# Patient Record
Sex: FEMALE | Race: WHITE | URBAN - METROPOLITAN AREA
[De-identification: names, ages, dates, MRNs, and addresses within clinical notes are randomized per-mention and may not be internally consistent; named-entity substitution may affect disease eponyms.]

---

## 2017-04-05 ENCOUNTER — OFFICE VISIT (OUTPATIENT)
Dept: PULMONOLOGY | Facility: CLINIC | Age: 32
End: 2017-04-05
Attending: INTERNAL MEDICINE
Payer: COMMERCIAL

## 2017-04-05 VITALS
DIASTOLIC BLOOD PRESSURE: 87 MMHG | TEMPERATURE: 97.7 F | WEIGHT: 227.3 LBS | HEIGHT: 67 IN | SYSTOLIC BLOOD PRESSURE: 124 MMHG | RESPIRATION RATE: 18 BRPM | BODY MASS INDEX: 35.67 KG/M2 | HEART RATE: 62 BPM | OXYGEN SATURATION: 98 %

## 2017-04-05 DIAGNOSIS — R91.8 PULMONARY NODULES: Primary | ICD-10-CM

## 2017-04-05 PROCEDURE — 99212 OFFICE O/P EST SF 10 MIN: CPT | Mod: ZF

## 2017-04-05 RX ORDER — CLONAZEPAM 0.5 MG/1
0.5 TABLET ORAL PRN
COMMUNITY
Start: 2016-03-28

## 2017-04-05 RX ORDER — MULTIPLE VITAMINS W/ MINERALS TAB 9MG-400MCG
1 TAB ORAL DAILY
COMMUNITY

## 2017-04-05 ASSESSMENT — PAIN SCALES - GENERAL: PAINLEVEL: NO PAIN (0)

## 2017-04-05 NOTE — MR AVS SNAPSHOT
"              After Visit Summary   2017    Job Christina    MRN: [id number]           Patient Information     Date Of Birth          1985        Visit Information        Provider Department      2017 3:20 PM Toney Castano MD [facility]        Today's Diagnoses     Pulmonary nodules    -  1       Follow-ups after your visit        Who to contact     If you have questions or need follow up information about today's clinic visit or your schedule please contact [facility] directly at [phone].  Normal or non-critical lab and imaging results will be communicated to you by [employer], letter or phone within 4 business days after the clinic has received the results. If you do not hear from us within 7 days, please contact the clinic through [employer] or phone. If you have a critical or abnormal lab result, we will notify you by phone as soon as possible.  Submit refill requests through [employer] or call your pharmacy and they will forward the refill request to us. Please allow 3 business days for your refill to be completed.          Additional Information About Your Visit        MyChart Information     [employer] lets you send messages to your doctor, view your test results, renew your prescriptions, schedule appointments and more. To sign up, go to [email or web address] . Click on \"Log in\" on the left side of the screen, which will take you to the Welcome page. Then click on \"Sign up Now\" on the right side of the page.     You will be asked to enter the access code listed below, as well as some personal information. Please follow the directions to create your username and password.     Your access code is: [email or web address]  Expires: 7/3/2017  6:30 AM     Your access code will  in 90 days. If you need help or a new code, please call your [facility] or [phone].        Care EveryWhere ID     This is your Care EveryWhere ID. This could be used by other " "organizations to access your [place] medical records  [id number]        Your Vitals Were     Pulse Temperature Respirations Height Last Period Pulse Oximetry    62 97.7  F (36.5  C) (Oral) 18 1.702 m (5' 7.01\") 03/21/2017 98%    BMI (Body Mass Index)                   35.59 kg/m2            Blood Pressure from Last 3 Encounters:   04/05/17 124/87   01/27/16 123/80   02/11/15 129/88    Weight from Last 3 Encounters:   04/05/17 103.1 kg (227 lb 4.8 oz)   01/27/16 97.9 kg (215 lb 14.4 oz)   02/11/15 94.2 kg (207 lb 9.6 oz)              Today, you had the following     No orders found for display         Today's Medication Changes          These changes are accurate as of: 4/5/17 11:59 PM.  If you have any questions, ask your nurse or doctor.               Stop taking these medicines if you haven't already. Please contact your care team if you have questions.     CHATEAL PO   Stopped by:  Toney Castano MD                    Primary Care Provider    Physician No Ref-Primary       No address on file        Thank you!     Thank you for choosing [facility]  for your care. Our goal is always to provide you with excellent care. Hearing back from our patients is one way we can continue to improve our services. Please take a few minutes to complete the written survey that you may receive in the mail after your visit with us. Thank you!             Your Updated Medication List - Protect others around you: Learn how to safely use, store and throw away your medicines at www.disposemymeds.org.          This list is accurate as of: 4/5/17 11:59 PM.  Always use your most recent med list.                   Brand Name Dispense Instructions for use    Biotin 5000 MCG Caps      Take 5,000 mcg by mouth daily       clonazePAM 0.5 MG tablet    klonoPIN     Take 0.5 mg by mouth as needed       COLLAGEN PO      Take by mouth daily       IBUPROFEN PO      Take 200 mg by mouth every 6 hours as needed for moderate " pain       Multi-vitamin Tabs tablet      Take 1 tablet by mouth daily       OMEGA-3 FISH OIL PO      Take by mouth daily

## 2017-04-05 NOTE — NURSING NOTE
"Job Christina is a 31 year old female who presents for:  Chief Complaint   Patient presents with     Oncology Clinic Visit     F/U Lung Nodule with CT Prior        Initial Vitals:  /87 (BP Location: Right arm, Patient Position: Chair, Cuff Size: Adult Large)  Pulse 62  Temp 97.7  F (36.5  C) (Oral)  Resp 18  Ht 1.702 m (5' 7.01\")  Wt 103.1 kg (227 lb 4.8 oz)  LMP 03/21/2017  SpO2 98%  BMI 35.59 kg/m2 Estimated body mass index is 35.59 kg/(m^2) as calculated from the following:    Height as of this encounter: 1.702 m (5' 7.01\").    Weight as of this encounter: 103.1 kg (227 lb 4.8 oz).. Body surface area is 2.21 meters squared. BP completed using cuff size: large  No Pain (0) Patient's last menstrual period was 03/21/2017. Allergies and medications reviewed.     Medications: Medication refills not needed today.  Pharmacy name entered into studdex: [id number] [address] AT [facility] [address]    Comments:     7 minutes for nursing intake (face to face time)   Meseret Crawley LPN        "

## 2017-04-05 NOTE — PROGRESS NOTES
[facility]  Lung Nodule Clinic Note  April 5, 2017    Chief complaint:  Job Christina is a 31 year old female seen in the Pulmonary Clinic  for   Chief Complaint   Patient presents with     Oncology Clinic Visit     F/U Lung Nodule with CT Prior     Assessment and Plan:  Pulmonary nodule.  This has been stable for the last 2 years.  No further followup is needed.  She smoked 10 pack years therefore not qualified for lung cancer screening ct chest..      I spent more than 15 minutes face to face and greater than 50% of time was for counseling and coordination of care about pulm nodule    History of Present Illness:  The patient is a 31-year-old woman with a right middle lobe pulmonary nodule.  She is here today for a followup after having a CT chest. No respiratory symptoms.     Exposure history: Asbestos;  No , TB;  No , Radiation;   No     Allergies   Allergen Reactions     Augmentin      Other reaction(s): Gastrointestinal  vomiting     Codeine      Other reaction(s): Vomiting     Codeine Camsylate Nausea and Vomiting     Sulfa Drugs Unknown     Other reaction(s): Vomiting        No past medical history on file.     Past Surgical History:   Procedure Laterality Date     NO HISTORY OF SURGERY  11/14/13    derm        Social History     Social History     Marital status: Single     Spouse name: N/A     Number of children: N/A     Years of education: N/A     Occupational History     Not on file.     Social History Main Topics     Smoking status: Former Smoker     Types: Cigarettes     Smokeless tobacco: Never Used     Alcohol use Not on file     Drug use: Not on file     Sexual activity: Not on file     Other Topics Concern     Not on file     Social History Narrative        Family History   Problem Relation Age of Onset     DIABETES Paternal Grandmother      Glaucoma Paternal Grandmother      Macular Degeneration Paternal Grandmother         Immunization History   Administered Date(s) Administered     Influenza  (IIV3) 10/22/2012       Current Outpatient Prescriptions   Medication Sig     clonazePAM (KLONOPIN) 0.5 MG tablet Take 0.5 mg by mouth as needed     multivitamin, therapeutic with minerals (MULTI-VITAMIN) TABS tablet Take 1 tablet by mouth daily     Biotin 5000 MCG CAPS Take 5,000 mcg by mouth daily     COLLAGEN PO Take by mouth daily     Omega-3 Fatty Acids (OMEGA-3 FISH OIL PO) Take by mouth daily     IBUPROFEN PO Take 200 mg by mouth every 6 hours as needed for moderate pain     [DISCONTINUED] spironolactone (ALDACTONE) 25 MG tablet Take 4 tablets (100 mg) by mouth daily     No current facility-administered medications for this visit.         Review of Systems:  I have done 10 points of review systems and pertinent findings are  ,otherwise negative.    Physical examination  Constitutional: Alert, oriented, not in distress  Vitals: B/P: 124/87, T: 97.7, P: 62, R: 18  Eyes: No icterus, nystagmus, pupils isocoric  ENT/Mouth:  No ear discharge, moist mucosa, no ulceration, tonsillar hypertrophy  Cardiovascular: Normal S1 and S2, no additional heart sounds, murmur, rub, normal peripheral pulses  Respiratory: Both hemithoraces are symmetrical, normal to palpation, no dullness to percussion, auscultation of lungs revealed decreased bronchovesicular sounds, expirium prolongation, wheezing, rhonci and crackles  Gastrointestinal/Rectal: Bowel sounds present, soft, non tender, non distended, no organomegaly, ascitis, mass  Genitourinary: No discharge   Musculoskeletal: Normal muscle mass, no dephormity  Integumentary:  No rash, normal texture and turgor, no edema  Neurological: Alert, orientedx3, no motor deficits, cranial nerves grossly normal  Psychiatric:  Mood and affect are appropriate with insight into his/her medical condition  Hematologic/Immunologic/Lymphatic: No bruise, no lymph node enargement     Data:  Lab Results   Component Value Date    WBC 9.3 11/14/2013     Lab Results   Component Value Date    RBC 4.95  11/14/2013     Lab Results   Component Value Date    HGB 14.5 11/14/2013     Lab Results   Component Value Date    HCT 42.7 11/14/2013     Lab Results   Component Value Date    MCV 86 11/14/2013     Lab Results   Component Value Date    MCH 29.3 11/14/2013     Lab Results   Component Value Date    MCHC 34.0 11/14/2013     Lab Results   Component Value Date    RDW 13.1 11/14/2013     Lab Results   Component Value Date     11/14/2013       No results found for: NA   Lab Results   Component Value Date    POTASSIUM 4.4 11/14/2013       Thank you for allowing me participate in the care of Job Christina.    BARB Castano MD

## 2017-04-05 NOTE — LETTER
4/5/2017       RE: Job Christina  [address]  [facility] [place] [address]     Dear Colleague,    Thank you for referring your patient, Job Christina, to the [facility] at [facility]. Please see a copy of my visit note below.    [facility]  Lung Nodule Clinic Note  April 5, 2017    Chief complaint:  Job Christina is a 31 year old female seen in the Pulmonary Clinic  for   Chief Complaint   Patient presents with     Oncology Clinic Visit     F/U Lung Nodule with CT Prior     Assessment and Plan:  Pulmonary nodule.  This has been stable for the last 2 years.  No further followup is needed.  She smoked 10 pack years therefore not qualified for lung cancer screening ct chest..      I spent more than 15 minutes face to face and greater than 50% of time was for counseling and coordination of care about pulm nodule    History of Present Illness:  The patient is a 31-year-old woman with a right middle lobe pulmonary nodule.  She is here today for a followup after having a CT chest. No respiratory symptoms.     Exposure history: Asbestos;  No , TB;  No , Radiation;   No     Allergies   Allergen Reactions     Augmentin      Other reaction(s): Gastrointestinal  vomiting     Codeine      Other reaction(s): Vomiting     Codeine Camsylate Nausea and Vomiting     Sulfa Drugs Unknown     Other reaction(s): Vomiting        No past medical history on file.     Past Surgical History:   Procedure Laterality Date     NO HISTORY OF SURGERY  11/14/13    derm        Social History     Social History     Marital status: Single     Spouse name: N/A     Number of children: N/A     Years of education: N/A     Occupational History     Not on file.     Social History Main Topics     Smoking status: Former Smoker     Types: Cigarettes     Smokeless tobacco: Never Used     Alcohol use Not on file     Drug use: Not on file     Sexual activity: Not on file     Other Topics Concern     Not  on file     Social History Narrative        Family History   Problem Relation Age of Onset     DIABETES Paternal Grandmother      Glaucoma Paternal Grandmother      Macular Degeneration Paternal Grandmother         Immunization History   Administered Date(s) Administered     Influenza (IIV3) 10/22/2012       Current Outpatient Prescriptions   Medication Sig     clonazePAM (KLONOPIN) 0.5 MG tablet Take 0.5 mg by mouth as needed     multivitamin, therapeutic with minerals (MULTI-VITAMIN) TABS tablet Take 1 tablet by mouth daily     Biotin 5000 MCG CAPS Take 5,000 mcg by mouth daily     COLLAGEN PO Take by mouth daily     Omega-3 Fatty Acids (OMEGA-3 FISH OIL PO) Take by mouth daily     IBUPROFEN PO Take 200 mg by mouth every 6 hours as needed for moderate pain     [DISCONTINUED] spironolactone (ALDACTONE) 25 MG tablet Take 4 tablets (100 mg) by mouth daily     No current facility-administered medications for this visit.         Review of Systems:  I have done 10 points of review systems and pertinent findings are  ,otherwise negative.    Physical examination  Constitutional: Alert, oriented, not in distress  Vitals: B/P: 124/87, T: 97.7, P: 62, R: 18  Eyes: No icterus, nystagmus, pupils isocoric  ENT/Mouth:  No ear discharge, moist mucosa, no ulceration, tonsillar hypertrophy  Cardiovascular: Normal S1 and S2, no additional heart sounds, murmur, rub, normal peripheral pulses  Respiratory: Both hemithoraces are symmetrical, normal to palpation, no dullness to percussion, auscultation of lungs revealed decreased bronchovesicular sounds, expirium prolongation, wheezing, rhonci and crackles  Gastrointestinal/Rectal: Bowel sounds present, soft, non tender, non distended, no organomegaly, ascitis, mass  Genitourinary: No discharge   Musculoskeletal: Normal muscle mass, no dephormity  Integumentary:  No rash, normal texture and turgor, no edema  Neurological: Alert, orientedx3, no motor deficits, cranial nerves grossly  normal  Psychiatric:  Mood and affect are appropriate with insight into his/her medical condition  Hematologic/Immunologic/Lymphatic: No bruise, no lymph node enargement     Data:  Lab Results   Component Value Date    WBC 9.3 11/14/2013     Lab Results   Component Value Date    RBC 4.95 11/14/2013     Lab Results   Component Value Date    HGB 14.5 11/14/2013     Lab Results   Component Value Date    HCT 42.7 11/14/2013     Lab Results   Component Value Date    MCV 86 11/14/2013     Lab Results   Component Value Date    MCH 29.3 11/14/2013     Lab Results   Component Value Date    MCHC 34.0 11/14/2013     Lab Results   Component Value Date    RDW 13.1 11/14/2013     Lab Results   Component Value Date     11/14/2013       No results found for: NA   Lab Results   Component Value Date    POTASSIUM 4.4 11/14/2013       Thank you for allowing me participate in the care of Job Christina.    BARB Castano MD

## 2017-11-13 ENCOUNTER — TELEPHONE (OUTPATIENT)
Dept: OPHTHALMOLOGY | Facility: CLINIC | Age: 32
End: 2017-11-13

## 2017-11-13 NOTE — TELEPHONE ENCOUNTER
Pt refill request of CTL prescription denies as exp date is 9/21/2016.  This info was faxed back to [facility] this morning at [phone].  Ana Lilia TRUONG 10:13 AM 11/13/2017

## 2018-06-25 DIAGNOSIS — R91.8 LUNG NODULES: Primary | ICD-10-CM

## 2018-12-14 ENCOUNTER — TELEPHONE (OUTPATIENT)
Dept: OPHTHALMOLOGY | Facility: CLINIC | Age: 33
End: 2018-12-14

## 2018-12-14 NOTE — TELEPHONE ENCOUNTER
[facility] Call Center    Phone Message    May a detailed message be left on voicemail: yes    Reason for Call: Other: Pt ordered contacts and they were denied by insurance for being the wrong type.  Please call Pt to assist with ordering.     Action Taken: Message routed to:  [facility] eye clinic

## 2018-12-19 NOTE — TELEPHONE ENCOUNTER
I called the patient and left a message asking she call the clinic in order to discuss contact lens ordering.

## 2018-12-21 ENCOUNTER — TELEPHONE (OUTPATIENT)
Dept: OPHTHALMOLOGY | Facility: CLINIC | Age: 33
End: 2018-12-21

## 2019-01-11 ENCOUNTER — TELEPHONE (OUTPATIENT)
Dept: OPHTHALMOLOGY | Facility: CLINIC | Age: 34
End: 2019-01-11

## 2021-08-12 ENCOUNTER — HOSPITAL ENCOUNTER (OUTPATIENT)
Facility: CLINIC | Age: 36
End: 2021-08-12
Attending: PLASTIC SURGERY | Admitting: PLASTIC SURGERY
Payer: COMMERCIAL

## 2021-08-12 DIAGNOSIS — Z11.59 ENCOUNTER FOR SCREENING FOR OTHER VIRAL DISEASES: ICD-10-CM

## 2022-06-03 ENCOUNTER — TRANSCRIBE ORDERS (OUTPATIENT)
Dept: OTHER | Age: 37
End: 2022-06-03
Payer: COMMERCIAL

## 2022-06-03 DIAGNOSIS — N39.3 SUI (STRESS URINARY INCONTINENCE, FEMALE): Primary | ICD-10-CM
